# Patient Record
Sex: FEMALE | Race: WHITE | Employment: FULL TIME
[De-identification: names, ages, dates, MRNs, and addresses within clinical notes are randomized per-mention and may not be internally consistent; named-entity substitution may affect disease eponyms.]

---

## 2017-01-13 PROBLEM — M54.50 CHRONIC BILATERAL LOW BACK PAIN WITHOUT SCIATICA: Status: ACTIVE | Noted: 2017-01-13

## 2017-01-13 PROBLEM — M54.12 CERVICAL RADICULOPATHY: Status: ACTIVE | Noted: 2017-01-13

## 2017-01-13 PROBLEM — G89.29 CHRONIC BILATERAL LOW BACK PAIN WITHOUT SCIATICA: Status: ACTIVE | Noted: 2017-01-13

## 2017-04-13 PROBLEM — M25.511 ACUTE PAIN OF RIGHT SHOULDER: Status: ACTIVE | Noted: 2017-04-13

## 2017-04-13 PROBLEM — M75.01 ADHESIVE CAPSULITIS OF RIGHT SHOULDER: Status: ACTIVE | Noted: 2017-04-13

## 2018-01-25 PROCEDURE — 88305 TISSUE EXAM BY PATHOLOGIST: CPT | Performed by: INTERNAL MEDICINE

## 2018-04-09 PROCEDURE — 87086 URINE CULTURE/COLONY COUNT: CPT | Performed by: EMERGENCY MEDICINE

## 2018-04-17 PROBLEM — K86.9 LESION OF PANCREAS: Status: ACTIVE | Noted: 2018-04-17

## 2018-04-17 PROBLEM — K58.0 IRRITABLE BOWEL SYNDROME WITH DIARRHEA: Status: ACTIVE | Noted: 2018-04-17

## 2018-04-17 PROBLEM — K86.9 LESION OF PANCREAS (HCC): Status: ACTIVE | Noted: 2018-04-17

## 2018-04-17 PROCEDURE — 82607 VITAMIN B-12: CPT | Performed by: INTERNAL MEDICINE

## 2018-04-17 PROCEDURE — 82784 ASSAY IGA/IGD/IGG/IGM EACH: CPT | Performed by: INTERNAL MEDICINE

## 2018-04-17 PROCEDURE — 83516 IMMUNOASSAY NONANTIBODY: CPT | Performed by: INTERNAL MEDICINE

## 2018-04-17 PROCEDURE — 82746 ASSAY OF FOLIC ACID SERUM: CPT | Performed by: INTERNAL MEDICINE

## 2018-04-17 PROCEDURE — 86256 FLUORESCENT ANTIBODY TITER: CPT | Performed by: INTERNAL MEDICINE

## 2018-05-31 ENCOUNTER — SURGERY (OUTPATIENT)
Age: 48
End: 2018-05-31

## 2018-05-31 ENCOUNTER — ANESTHESIA (OUTPATIENT)
Dept: ENDOSCOPY | Facility: HOSPITAL | Age: 48
End: 2018-05-31
Payer: COMMERCIAL

## 2018-05-31 ENCOUNTER — HOSPITAL ENCOUNTER (OUTPATIENT)
Facility: HOSPITAL | Age: 48
Setting detail: HOSPITAL OUTPATIENT SURGERY
Discharge: HOME | End: 2018-05-31
Attending: INTERNAL MEDICINE | Admitting: INTERNAL MEDICINE
Payer: COMMERCIAL

## 2018-05-31 ENCOUNTER — ANESTHESIA EVENT (OUTPATIENT)
Dept: ENDOSCOPY | Facility: HOSPITAL | Age: 48
End: 2018-05-31
Payer: COMMERCIAL

## 2018-05-31 VITALS
DIASTOLIC BLOOD PRESSURE: 76 MMHG | BODY MASS INDEX: 26.91 KG/M2 | RESPIRATION RATE: 17 BRPM | SYSTOLIC BLOOD PRESSURE: 98 MMHG | HEIGHT: 62.5 IN | WEIGHT: 150 LBS | HEART RATE: 88 BPM | OXYGEN SATURATION: 100 %

## 2018-05-31 DIAGNOSIS — K86.2 PANCREAS CYST: ICD-10-CM

## 2018-05-31 PROCEDURE — 82378 CARCINOEMBRYONIC ANTIGEN: CPT | Performed by: INTERNAL MEDICINE

## 2018-05-31 PROCEDURE — BD47ZZZ ULTRASONOGRAPHY OF GASTROINTESTINAL TRACT: ICD-10-PCS | Performed by: INTERNAL MEDICINE

## 2018-05-31 PROCEDURE — 0FBG8ZX EXCISION OF PANCREAS, VIA NATURAL OR ARTIFICIAL OPENING ENDOSCOPIC, DIAGNOSTIC: ICD-10-PCS | Performed by: INTERNAL MEDICINE

## 2018-05-31 PROCEDURE — 88173 CYTOPATH EVAL FNA REPORT: CPT | Performed by: INTERNAL MEDICINE

## 2018-05-31 PROCEDURE — 81025 URINE PREGNANCY TEST: CPT

## 2018-05-31 PROCEDURE — 36415 COLL VENOUS BLD VENIPUNCTURE: CPT | Performed by: INTERNAL MEDICINE

## 2018-05-31 PROCEDURE — 82150 ASSAY OF AMYLASE: CPT | Performed by: INTERNAL MEDICINE

## 2018-05-31 PROCEDURE — 88307 TISSUE EXAM BY PATHOLOGIST: CPT | Performed by: INTERNAL MEDICINE

## 2018-05-31 RX ORDER — SODIUM CHLORIDE, SODIUM LACTATE, POTASSIUM CHLORIDE, CALCIUM CHLORIDE 600; 310; 30; 20 MG/100ML; MG/100ML; MG/100ML; MG/100ML
INJECTION, SOLUTION INTRAVENOUS CONTINUOUS PRN
Status: DISCONTINUED | OUTPATIENT
Start: 2018-05-31 | End: 2018-05-31 | Stop reason: SURG

## 2018-05-31 RX ORDER — SODIUM CHLORIDE, SODIUM LACTATE, POTASSIUM CHLORIDE, CALCIUM CHLORIDE 600; 310; 30; 20 MG/100ML; MG/100ML; MG/100ML; MG/100ML
INJECTION, SOLUTION INTRAVENOUS CONTINUOUS
Status: DISCONTINUED | OUTPATIENT
Start: 2018-05-31 | End: 2018-05-31

## 2018-05-31 RX ORDER — NALOXONE HYDROCHLORIDE 0.4 MG/ML
80 INJECTION, SOLUTION INTRAMUSCULAR; INTRAVENOUS; SUBCUTANEOUS AS NEEDED
Status: DISCONTINUED | OUTPATIENT
Start: 2018-05-31 | End: 2018-05-31

## 2018-05-31 RX ORDER — LIDOCAINE HYDROCHLORIDE 10 MG/ML
INJECTION, SOLUTION EPIDURAL; INFILTRATION; INTRACAUDAL; PERINEURAL AS NEEDED
Status: DISCONTINUED | OUTPATIENT
Start: 2018-05-31 | End: 2018-05-31 | Stop reason: SURG

## 2018-05-31 RX ADMIN — SODIUM CHLORIDE, SODIUM LACTATE, POTASSIUM CHLORIDE, CALCIUM CHLORIDE: 600; 310; 30; 20 INJECTION, SOLUTION INTRAVENOUS at 13:23:00

## 2018-05-31 RX ADMIN — SODIUM CHLORIDE, SODIUM LACTATE, POTASSIUM CHLORIDE, CALCIUM CHLORIDE: 600; 310; 30; 20 INJECTION, SOLUTION INTRAVENOUS at 13:40:00

## 2018-05-31 RX ADMIN — LIDOCAINE HYDROCHLORIDE 100 MG: 10 INJECTION, SOLUTION EPIDURAL; INFILTRATION; INTRACAUDAL; PERINEURAL at 13:23:00

## 2018-05-31 NOTE — ANESTHESIA POSTPROCEDURE EVALUATION
Patient: Harshil Shah    Procedure Summary     Date:  05/31/18 Room / Location:  [facility] ENDOSCOPY 01 / [facility] ENDOSCOPY    Anesthesia Start:  1320 Anesthesia Stop:  4092    Procedure:  ENDOSCOPIC ULTRASOUND (EUS) (N/A ) Diagnosis:       Pancreas cyst

## 2018-05-31 NOTE — PROGRESS NOTES
CEA/Amylase c/w benign serous cystadenoma. Cytology is pending. Will discuss management with RA.     EUS report  In the tail the pancreas there was a microcystic lesion seen measure approximately 2.1 x 1.6 cm.   There were 2 larger components of it where th

## 2018-05-31 NOTE — ANESTHESIA PREPROCEDURE EVALUATION
Anesthesia PreOp Note    HPI:     Concetta Pitt is a 56year old female who presents for preoperative consultation requested by: Elian Matson MD    Date of Surgery: 5/31/2018    Procedure(s):  ENDOSCOPIC ULTRASOUND (EUS)  Indication: Pancreas cyst Current Facility-Administered Medications Ordered in Epic:  Ampicillin-Sulbactam Sodium (UNASYN) 3 g in sodium chloride 0.9 % 100 mL MBP/ADD-vantage 3 g Intravenous Once Emma Parish MD Last Rate: 25 mL/hr at 05/31/18 1314 3 g at 05/31/18 1314 her pulse is 67. Her respiration is 20 and oxygen saturation is 99%.     05/31/18  1255 05/31/18  1304   BP: 117/69    BP Location: Left arm    Pulse: 67    Resp: 20    SpO2: 99%    Weight:  68 kg (150 lb)   Height:  1.588 m (5' 2.5\")        Anesthesia ROS

## 2018-05-31 NOTE — OPERATIVE REPORT
OPERATIVE REPORT   PATIENT NAME: Reuben Baumgarten  MRN: [id number]  DATE OF OPERATION: 5/31/2018  PREOPERATIVE DIAGNOSIS:   1. Pancreatic tail cyst  POSTOPERATIVE DIAGNOSES:  1.  Vicryl cystic lesion in the tail of the pancreas most likely serocystadenom second portion duodenum as well as duodenal bulb. The main pancreatic duct and the bile duct were both seen arising from the major papilla. No obvious abnormality was seen. Scope was then removed a suction the gastric content. IMPRESSION:  1.  Microcy

## 2018-06-05 NOTE — PROGRESS NOTES
Here are the results from your endoscopic ultrasound:  Endoscopic ultrasound and fluid analysis were most consistent with a benign cyst called serous cystadenoma. Recommend continued surveillance as discussed. Follow-up recommendations:  1.   MRI MRCP p

## 2019-05-02 PROBLEM — Z01.818 PREOPERATIVE EVALUATION TO RULE OUT SURGICAL CONTRAINDICATION: Status: ACTIVE | Noted: 2019-05-02

## 2019-09-30 PROCEDURE — 88175 CYTOPATH C/V AUTO FLUID REDO: CPT | Performed by: OBSTETRICS & GYNECOLOGY

## 2019-09-30 PROCEDURE — 87624 HPV HI-RISK TYP POOLED RSLT: CPT | Performed by: OBSTETRICS & GYNECOLOGY

## 2020-11-06 PROBLEM — E78.00 PURE HYPERCHOLESTEROLEMIA: Status: ACTIVE | Noted: 2020-11-06

## 2020-12-23 PROCEDURE — 88305 TISSUE EXAM BY PATHOLOGIST: CPT | Performed by: INTERNAL MEDICINE

## 2022-11-14 ENCOUNTER — PATIENT OUTREACH (OUTPATIENT)
Dept: CASE MANAGEMENT | Age: 52
End: 2022-11-14

## 2022-11-14 NOTE — PROGRESS NOTES
VM received; pt requesting assistance w/scheduling apt - not HFU, pt has not been in the hospital.  May transfer pt to the Dr office they would like to schedule with.

## 2022-11-14 NOTE — PROGRESS NOTES
VM received; pt requesting assistance w/scheduling apt - not HFU, pt has not been in the hospital.  May transfer pt to the Dr office they would like to schedule with.     Spk to pt who sts she was able to schedule appt herself    Closing encounter

## 2024-03-10 ENCOUNTER — APPOINTMENT (OUTPATIENT)
Dept: CT IMAGING | Facility: HOSPITAL | Age: 54
End: 2024-03-10
Attending: EMERGENCY MEDICINE
Payer: COMMERCIAL

## 2024-03-10 ENCOUNTER — HOSPITAL ENCOUNTER (EMERGENCY)
Facility: HOSPITAL | Age: 54
Discharge: HOME | End: 2024-03-11
Attending: EMERGENCY MEDICINE
Payer: COMMERCIAL

## 2024-03-10 DIAGNOSIS — R10.9 ABDOMINAL PAIN, ACUTE: Primary | ICD-10-CM

## 2024-03-10 LAB
ANION GAP SERPL CALC-SCNC: 4 MMOL/L (ref 0–18)
BASOPHILS # BLD AUTO: 0.03 X10(3) UL (ref 0–0.2)
BASOPHILS NFR BLD AUTO: 0.4 %
BILIRUB UR QL: NEGATIVE
BUN BLD-MCNC: 12 MG/DL (ref 9–23)
BUN/CREAT SERPL: 14.3 (ref 10–20)
CALCIUM BLD-MCNC: 9.2 MG/DL (ref 8.7–10.4)
CHLORIDE SERPL-SCNC: 106 MMOL/L (ref 98–112)
CLARITY UR: CLEAR
CO2 SERPL-SCNC: 26 MMOL/L (ref 21–32)
COLOR UR: COLORLESS
CREAT BLD-MCNC: 0.84 MG/DL
DEPRECATED RDW RBC AUTO: 39.5 FL (ref 35.1–46.3)
EGFRCR SERPLBLD CKD-EPI 2021: 84 ML/MIN/1.73M2 (ref 60–?)
EOSINOPHIL # BLD AUTO: 0.11 X10(3) UL (ref 0–0.7)
EOSINOPHIL NFR BLD AUTO: 1.5 %
ERYTHROCYTE [DISTWIDTH] IN BLOOD BY AUTOMATED COUNT: 12.3 % (ref 11–15)
GLUCOSE BLD-MCNC: 97 MG/DL (ref 70–99)
GLUCOSE UR-MCNC: NORMAL MG/DL
HCT VFR BLD AUTO: 41.6 %
HGB BLD-MCNC: 13.7 G/DL
HGB UR QL STRIP.AUTO: NEGATIVE
IMM GRANULOCYTES # BLD AUTO: 0.01 X10(3) UL (ref 0–1)
IMM GRANULOCYTES NFR BLD: 0.1 %
KETONES UR-MCNC: NEGATIVE MG/DL
LEUKOCYTE ESTERASE UR QL STRIP.AUTO: NEGATIVE
LYMPHOCYTES # BLD AUTO: 2.48 X10(3) UL (ref 1–4)
LYMPHOCYTES NFR BLD AUTO: 33.9 %
MCH RBC QN AUTO: 28.5 PG (ref 26–34)
MCHC RBC AUTO-ENTMCNC: 32.9 G/DL (ref 31–37)
MCV RBC AUTO: 86.7 FL
MONOCYTES # BLD AUTO: 0.63 X10(3) UL (ref 0.1–1)
MONOCYTES NFR BLD AUTO: 8.6 %
NEUTROPHILS # BLD AUTO: 4.05 X10 (3) UL (ref 1.5–7.7)
NEUTROPHILS # BLD AUTO: 4.05 X10(3) UL (ref 1.5–7.7)
NEUTROPHILS NFR BLD AUTO: 55.5 %
NITRITE UR QL STRIP.AUTO: NEGATIVE
OSMOLALITY SERPL CALC.SUM OF ELEC: 282 MOSM/KG (ref 275–295)
PH UR: 6 [PH] (ref 5–8)
PLATELET # BLD AUTO: 193 10(3)UL (ref 150–450)
POTASSIUM SERPL-SCNC: 3.9 MMOL/L (ref 3.5–5.1)
PROT UR-MCNC: NEGATIVE MG/DL
RBC # BLD AUTO: 4.8 X10(6)UL
SODIUM SERPL-SCNC: 136 MMOL/L (ref 136–145)
SP GR UR STRIP: 1.01 (ref 1–1.03)
UROBILINOGEN UR STRIP-ACNC: NORMAL
WBC # BLD AUTO: 7.3 X10(3) UL (ref 4–11)

## 2024-03-10 PROCEDURE — 96374 THER/PROPH/DIAG INJ IV PUSH: CPT

## 2024-03-10 PROCEDURE — 99284 EMERGENCY DEPT VISIT MOD MDM: CPT

## 2024-03-10 PROCEDURE — 81003 URINALYSIS AUTO W/O SCOPE: CPT | Performed by: EMERGENCY MEDICINE

## 2024-03-10 PROCEDURE — 81003 URINALYSIS AUTO W/O SCOPE: CPT

## 2024-03-10 PROCEDURE — 74177 CT ABD & PELVIS W/CONTRAST: CPT | Performed by: EMERGENCY MEDICINE

## 2024-03-10 PROCEDURE — 85025 COMPLETE CBC W/AUTO DIFF WBC: CPT | Performed by: EMERGENCY MEDICINE

## 2024-03-10 PROCEDURE — 80048 BASIC METABOLIC PNL TOTAL CA: CPT | Performed by: EMERGENCY MEDICINE

## 2024-03-10 PROCEDURE — 99285 EMERGENCY DEPT VISIT HI MDM: CPT

## 2024-03-10 RX ORDER — MORPHINE SULFATE 4 MG/ML
4 INJECTION, SOLUTION INTRAMUSCULAR; INTRAVENOUS ONCE
Status: COMPLETED | OUTPATIENT
Start: 2024-03-10 | End: 2024-03-10

## 2024-03-10 NOTE — ED INITIAL ASSESSMENT (HPI)
RLQ pain with radiation to right flank that began yesterday, an episode of diarrhea. Denies urinary complaints. Denies fever. + nausea.

## 2024-03-11 VITALS
RESPIRATION RATE: 16 BRPM | TEMPERATURE: 99 F | HEIGHT: 62 IN | DIASTOLIC BLOOD PRESSURE: 79 MMHG | BODY MASS INDEX: 27.6 KG/M2 | HEART RATE: 84 BPM | SYSTOLIC BLOOD PRESSURE: 111 MMHG | OXYGEN SATURATION: 94 % | WEIGHT: 150 LBS

## 2024-03-11 RX ORDER — DICYCLOMINE HCL 20 MG
20 TABLET ORAL 4 TIMES DAILY PRN
Qty: 30 TABLET | Refills: 0 | Status: SHIPPED | OUTPATIENT
Start: 2024-03-11 | End: 2024-04-10

## 2024-03-11 NOTE — ED PROVIDER NOTES
Patient Seen in: [facility] Emergency Department    History     Chief Complaint   Patient presents with    Abdominal Pain       HPI    History is provided by patient/independent historian: Patient  53 year old female with with history of IBS here with complaints of right lower quadrant abdominal pain for the past 2 days, now worse.  Patient reports some diarrhea without any vomiting, but she does have additional nausea.  No urinary symptoms.  No fevers or chills.  She did recently recover from a cold and has completed a course of antibiotics recently.  No history of C. difficile.  No recent travel.    History reviewed.   Past Medical History:   Diagnosis Date    High cholesterol     IBS (irritable bowel syndrome)     IBS D         History reviewed.   Past Surgical History:   Procedure Laterality Date          COLONOSCOPY N/A 2018    Procedure: COLONOSCOPY, POSSIBLE BIOPSY, POSSIBLE POLYPECTOMY 42953;  Surgeon: Augustine Torres MD;  Location: [facility]    COLONOSCOPY N/A 2020    one small TA, one small HP, diverticulosis, int hem, rpt     OTHER SURGICAL HISTORY      right hand cyst removal    OTHER SURGICAL HISTORY  2012    [facility] Medications reviewed :  (Not in a hospital admission)        History reviewed.   Social History     Socioeconomic History    Marital status:    Tobacco Use    Smoking status: Former    Smokeless tobacco: Never   Vaping Use    Vaping Use: Never used   Substance and Sexual Activity    Alcohol use: No     Alcohol/week: 0.0 standard drinks of alcohol    Drug use: No    Sexual activity: Yes     Partners: Male     Birth control/protection: Vasectomy   Other Topics Concern    Exercise Yes     Comment: 3-4 days weekly for 60 minutes    Seat Belt Yes    Self-Exams Yes         ROS  Review of Systems   Respiratory:  Negative for shortness of breath.    Cardiovascular:  Negative for chest pain.   Gastrointestinal:  Positive  for abdominal pain, diarrhea and nausea.   All other systems reviewed and are negative.     All other pertinent organ systems are reviewed and are negative.      Physical Exam     ED Triage Vitals   BP 03/10/24 1832 133/85   Pulse 03/10/24 1832 102   Resp 03/10/24 1832 19   Temp 03/10/24 1832 98.6 °F (37 °C)   Temp src 03/10/24 1832 Temporal   SpO2 03/10/24 1832 96 %   O2 Device 03/10/24 2215 None (Room air)     Vital signs reviewed.      Physical Exam  Vitals and nursing note reviewed.   Cardiovascular:      Pulses: Normal pulses.   Pulmonary:      Effort: No respiratory distress.   Abdominal:      General: There is no distension.      Tenderness: There is abdominal tenderness in the right lower quadrant.   Neurological:      Mental Status: She is alert.         ED Course       Labs:     Labs Reviewed   URINALYSIS WITH CULTURE REFLEX - Abnormal; Notable for the following components:       Result Value    Urine Color Colorless (*)     All other components within normal limits   BASIC METABOLIC PANEL (8) - Normal   CBC WITH DIFFERENTIAL WITH PLATELET    Narrative:     The following orders were created for panel order CBC With Differential With Platelet.                  Procedure                               Abnormality         Status                                     ---------                               -----------         ------                                     CBC W/ DIFFERENTIAL[[id number]]                              Final result                                                 Please view results for these tests on the individual orders.   CBC W/ DIFFERENTIAL         My EKG Interpretation:   As reviewed and Interpreted by me      Imaging Results Available and Reviewed while in ED:   No results found.    CT ABDOMEN PELVIS WITH IV CONTRAST    Comparison 1/7/2008    IMPRESSION:  CT ABDOMEN PELVIS:  -No evidence of acute intra-abdominal or intra-pelvic abnormality.    -Unremarkable appendix, small  bowel.  -Colonic diverticulosis without evidence of acute diverticulitis.  -Age-appropriate uterus and ovaries.  -Interval decrease in size in the 1.5 cm cystic lesion in the pancreatic tail.  No evidence of acute pancreatitis.  -Unremarkable liver, gallbladder, stomach, spleen, adrenal glands, kidneys and aorta.  -No ascites or pneumoperitoneum.        The results were faxed/finalized only at 0119 hrs ET. If you would like to discuss this case directly please call [phone] (extension [phone]).  If you can't reach me at this number, do not leave a voicemail.  Please call [phone] ext [phone] and ask for the next available Radiologist.    Rafael Garza M.D.  This report has been electronically signed and verified by the Radiologist whose name is printed above.    DD:  03/10/2024/DT:  03/11/2024  My review and independent interpretation of CT images: no free air. Radiology report corroborates this in addition to other details as reported by them.      Decision rules/scores evaluated: none      Diagnostic labs/tests considered but not ordered: none    ED Medications Administered:   Medications   morphINE PF 4 MG/ML injection 4 mg (4 mg Intravenous Given 3/10/24 2212)   iopamidol 76% (ISOVUE-370) injection for power injector (80 mL Intravenous Given 3/10/24 2266)                MDM       Medical Decision Making      Differential Diagnosis: After obtaining the patient's history, performing the physical exam and reviewing the diagnostics, multiple initial diagnoses were considered based on the presenting problem including nephrolithiasis, appendicitis, diverticulitis, pneumoperitoneum    External document review: I personally reviewed available external medical records for any recent pertinent discharge summaries, testing, and procedures - the findings are as follows: 3/5/24 visit with BRENDAN Joseph for viral URI    Complicating Factors: The patient already  has a past medical history of High cholesterol and IBS (irritable  bowel syndrome). to contribute to the complexity of this ED evaluation.    Procedures performed: none    Discussed management with physician/appropriate source: none    Considered admission/deescalation of care for:  abdominal pain    Social determinants of health affecting patient care: none    Prescription medications considered: bentyl, discussed continuing current medication regimen    The patient requires continuous monitoring for: abdominal pain    Shared decision making: discussed possible admission          Disposition and Plan     Clinical Impression:  1. Abdominal pain, acute        Disposition:  Discharge    Follow-up:  Jde Haynes MD  [address] [place] [address] [id number] [phone]    Follow up        Medications Prescribed:  Current Discharge Medication List        START taking these medications    Details   dicyclomine 20 MG Oral Tab Take 1 tablet (20 mg total) by mouth 4 (four) times daily as needed.  Qty: 30 tablet, Refills: 0

## 2024-03-11 NOTE — ED QUICK NOTES
Right after morphine given pt c/o lightheadedness and nausea. No vomiting.   Resolved w/in a few minutes, declined zofran.   Monitoring VS.   Pe Ell provided for comfort.  Pending labs then CT.

## 2024-03-11 NOTE — ED QUICK NOTES
Pt a/ox4, respirations unlabored, speech full/clear, gait steady, no acute distress. All ED orders completed.   Pt instructed to return to ED for any new/severe s/s or as directed by provider, and to see PMD/specialist ASAP or as directed by provider.

## (undated) DEVICE — ENDOSCOPIC ULTRASOUND FINE NEEDLE BIOPSY (FNB) DEVICE: Brand: ACQUIRE

## (undated) DEVICE — CANNULA NASAL 02/C02 ADULT

## (undated) DEVICE — Device

## (undated) DEVICE — Device: Brand: BALLOON3

## (undated) DEVICE — ENDOSCOPY PACK UPPER: Brand: MEDLINE INDUSTRIES, INC.

## (undated) NOTE — LETTER
6/6/2018  [place] [id number]          Dear Yesy Strickland,     Here are the results from your endoscopic ultrasound:  Endoscopic ultrasound and fluid analysis were most consistent with a benign cyst called serous

## (undated) NOTE — LETTER
[address], [place]  Authorization for Invasive Procedures  1.  I hereby authorize Dr. Toñito Aponte , my physician and whomever may be designated as the doctor's assistant, to perform the following operation and/or procedure:  Endos performed for the purposes of advancing medicine, science, and/or education, provided my identity is not revealed. If the procedure has been videotaped, the physician/surgeon will obtain the original videotape.  The hospital will not be responsible for stor My signature below affirms that prior to the time of the procedure, I have explained to the patient and/or her legal representative, the risks and benefits involved in the proposed treatment and any reasonable alternative to the proposed treatment.  I have

## (undated) NOTE — LETTER
[facility]  Administration of Anesthesia  1. I, Taylor Nagel, or _________________________________ acting on her behalf, (Patient) (Dependent/Representative) request to receive anesthesia for my pending procedure/operation/treatment. infections, high spinal block, spinal bleeding, seizure, cardiac arrest and death. 7. AWARENESS: I understand that it is possible (but unlikely) to have explicit memory of events from the operating room while under general anesthesia.   8. ELECTROCONVULSIV unconscious pt /Relationship    My signature below affirms that prior to the time of the procedure, I have explained to the patient and/or his/her guardian, the risks and benefits of undergoing anesthesia, as well as any reasonable alternatives.     _______